# Patient Record
Sex: FEMALE | Race: WHITE | NOT HISPANIC OR LATINO | URBAN - METROPOLITAN AREA
[De-identification: names, ages, dates, MRNs, and addresses within clinical notes are randomized per-mention and may not be internally consistent; named-entity substitution may affect disease eponyms.]

---

## 2020-02-06 ENCOUNTER — OFFICE (OUTPATIENT)
Dept: URBAN - METROPOLITAN AREA CLINIC 33 | Facility: CLINIC | Age: 80
End: 2020-02-06
Payer: COMMERCIAL

## 2020-02-06 VITALS
TEMPERATURE: 97.7 F | DIASTOLIC BLOOD PRESSURE: 82 MMHG | SYSTOLIC BLOOD PRESSURE: 135 MMHG | HEART RATE: 82 BPM | HEIGHT: 67 IN | WEIGHT: 128 LBS

## 2020-02-06 DIAGNOSIS — Z79.1 LONG TERM (CURRENT) USE OF NON-STEROIDAL ANTI-INFLAMMATORIES: ICD-10-CM

## 2020-02-06 DIAGNOSIS — R19.4 CHANGE IN BOWEL HABIT: ICD-10-CM

## 2020-02-06 DIAGNOSIS — R63.4 ABNORMAL WEIGHT LOSS: ICD-10-CM

## 2020-02-06 DIAGNOSIS — R63.0 ANOREXIA: ICD-10-CM

## 2020-02-06 PROCEDURE — 99204 OFFICE O/P NEW MOD 45 MIN: CPT

## 2020-02-06 NOTE — SERVICEHPINOTES
GURJIT NGO   is a   79  female who presents with weight loss.  PCP referred the patient here with weight loss, lost about 15 lbs since last year. Had laminectomy in 09/2019 and since then has been losing weight. Decreased in appetite. Denies nausea, vomiting, dysphagia, acid reflux or dyspepsia.     BR+ Constipation since 2-3 weeks ago which is new. Takes miralax to move bowel. Took opioids after surgery which stopped 2 months ago. Wellbutrin was put on a year ago.  BRUsed to move bowel every day or every other day. Lately, can had no BM for a week then takes something. BRDenies blood in stools, melena, diarrhea or lower abdominal pain.BRHad a colonoscopy in the remote past, not sure when. Had a cologuard DNA study approx. 18 months ago and it was negative per patient. BRTakes Excedrin a couple of times a day for HA, has been taking this for 8-9 months. BRDenies family hx of colon cancer. BRDenies chest pain, palpitations or sob with exertion. BR

## 2022-05-12 ENCOUNTER — OFFICE (OUTPATIENT)
Dept: URBAN - METROPOLITAN AREA CLINIC 34 | Facility: CLINIC | Age: 82
End: 2022-05-12
Payer: COMMERCIAL

## 2022-05-12 VITALS
TEMPERATURE: 97.3 F | HEIGHT: 67 IN | DIASTOLIC BLOOD PRESSURE: 79 MMHG | HEART RATE: 77 BPM | SYSTOLIC BLOOD PRESSURE: 138 MMHG | WEIGHT: 129 LBS

## 2022-05-12 DIAGNOSIS — Z79.1 LONG TERM (CURRENT) USE OF NON-STEROIDAL ANTI-INFLAMMATORIES: ICD-10-CM

## 2022-05-12 DIAGNOSIS — D50.9 IRON DEFICIENCY ANEMIA, UNSPECIFIED: ICD-10-CM

## 2022-05-12 PROCEDURE — 99204 OFFICE O/P NEW MOD 45 MIN: CPT

## 2022-05-12 NOTE — SERVICEHPINOTES
GURJIT NGO   is a   82   year old    female who is being seen in consultation at the request of   KAITLIN JUNE   for anemia. Patient reports this is a new problem. She had been feeling lightheaded and fatigued and went to see PCP. Labs 4/6/22 revealed TSH 14.9, Hgb 10.4, MCH 33.9, , MCHC 33.9. She was seen in our office in early 2020 for "weight loss" and was scheduled for upper and lower endoscopy but it was canceled d/t pandemic. States at the time she had lost ~10 lbs but her weight has been stable since. She cannot recall when her last colonoscopy was, however she states they have always been normal. Her bowel habits are regular with use of Miralax daily, stools type 5-6 BSS. No hematochezia, melena or abdominal pain. She denies upper GI sx no reflux, nausea, hematemesis or epigastric pain. She endorses Excedrin use about once a week, though admits she used to take this a lot more frequent prior to 2020, when she was told to stop. States it is the only thing that helps her headache. Also on low dose asa. Has occasional night sweats. Denies fevers, chills.br

## 2022-07-06 ENCOUNTER — ON CAMPUS - OUTPATIENT (OUTPATIENT)
Dept: URBAN - METROPOLITAN AREA HOSPITAL 16 | Facility: HOSPITAL | Age: 82
End: 2022-07-06
Payer: COMMERCIAL

## 2022-07-06 ENCOUNTER — ON CAMPUS - OUTPATIENT (OUTPATIENT)
Dept: URBAN - METROPOLITAN AREA HOSPITAL 16 | Facility: HOSPITAL | Age: 82
End: 2022-07-06

## 2022-07-06 DIAGNOSIS — K29.60 OTHER GASTRITIS WITHOUT BLEEDING: ICD-10-CM

## 2022-07-06 DIAGNOSIS — D50.9 IRON DEFICIENCY ANEMIA, UNSPECIFIED: ICD-10-CM

## 2022-07-06 DIAGNOSIS — K56.2 VOLVULUS: ICD-10-CM

## 2022-07-06 DIAGNOSIS — Z53.8 PROCEDURE AND TREATMENT NOT CARRIED OUT FOR OTHER REASONS: ICD-10-CM

## 2022-07-06 DIAGNOSIS — K25.9 GASTRIC ULCER, UNSPECIFIED AS ACUTE OR CHRONIC, WITHOUT HEMO: ICD-10-CM

## 2022-07-06 PROCEDURE — 43239 EGD BIOPSY SINGLE/MULTIPLE: CPT | Performed by: INTERNAL MEDICINE

## 2022-07-06 PROCEDURE — 00002: CPT | Performed by: INTERNAL MEDICINE

## 2022-07-06 PROCEDURE — 45378 DIAGNOSTIC COLONOSCOPY: CPT | Mod: 52 | Performed by: INTERNAL MEDICINE
